# Patient Record
Sex: FEMALE | Race: WHITE | Employment: FULL TIME | URBAN - METROPOLITAN AREA
[De-identification: names, ages, dates, MRNs, and addresses within clinical notes are randomized per-mention and may not be internally consistent; named-entity substitution may affect disease eponyms.]

---

## 2022-05-24 ENCOUNTER — TELEPHONE (OUTPATIENT)
Dept: ORTHOPEDIC SURGERY | Age: 62
End: 2022-05-24

## 2022-05-24 NOTE — TELEPHONE ENCOUNTER
She is to have a MRI of her spine b4 6/1 please cassandra MRI the order is in old epic MRI will covert Order, thank you.

## 2022-06-01 ENCOUNTER — OFFICE VISIT (OUTPATIENT)
Dept: ORTHOPEDIC SURGERY | Age: 62
End: 2022-06-01
Payer: COMMERCIAL

## 2022-06-01 DIAGNOSIS — M17.11 OSTEOARTHRITIS OF RIGHT KNEE, UNSPECIFIED OSTEOARTHRITIS TYPE: ICD-10-CM

## 2022-06-01 DIAGNOSIS — G89.29 CHRONIC PAIN OF RIGHT KNEE: Primary | ICD-10-CM

## 2022-06-01 DIAGNOSIS — M25.561 CHRONIC PAIN OF RIGHT KNEE: Primary | ICD-10-CM

## 2022-06-01 PROCEDURE — 99204 OFFICE O/P NEW MOD 45 MIN: CPT | Performed by: ORTHOPAEDIC SURGERY

## 2022-06-01 PROCEDURE — 20611 DRAIN/INJ JOINT/BURSA W/US: CPT | Performed by: ORTHOPAEDIC SURGERY

## 2022-06-01 RX ORDER — TRIAMCINOLONE ACETONIDE 40 MG/ML
40 INJECTION, SUSPENSION INTRA-ARTICULAR; INTRAMUSCULAR ONCE
Status: COMPLETED | OUTPATIENT
Start: 2022-06-01 | End: 2022-06-01

## 2022-06-01 RX ADMIN — TRIAMCINOLONE ACETONIDE 40 MG: 40 INJECTION, SUSPENSION INTRA-ARTICULAR; INTRAMUSCULAR at 14:38

## 2022-06-01 NOTE — PROGRESS NOTES
Name: Althea Ashley  YOB: 1960  Gender: female  MRN: [id number]    CC:   Chief Complaint   Patient presents with    Knee Pain     Right new issues          HPI:   The pain has been present for months and is becoming worse. She was seen 1 week ago with left knee discomfort. She may possibly have instability of the left knee but this would be subtle. I am more suspicious she could have referred pain from her lumbar spine involving left side. This is Workmen's Comp. related. She is awaiting MRI approval.  It hurts at night when sleeping now involving the right knee. .  The pain is located over the knee. It does hurt to walk and gets worse with increased distances. The pain does not radiate down the leg. Numbness and tingling are not noted. Treatment so far has been medication injection therapies. No current outpatient medications on file. Not on File  History reviewed. No pertinent past medical history. .pmh  History reviewed. No pertinent surgical history. History reviewed. No pertinent family history. Social History     Socioeconomic History    Marital status: Unknown     Spouse name: Not on file    Number of children: Not on file    Years of education: Not on file    Highest education level: Not on file   Occupational History    Not on file   Tobacco Use    Smoking status: Not on file    Smokeless tobacco: Not on file   Substance and Sexual Activity    Alcohol use: Not on file    Drug use: Not on file    Sexual activity: Not on file   Other Topics Concern    Not on file   Social History Narrative    Not on file     Social Determinants of Health     Financial Resource Strain:     Difficulty of Paying Living Expenses: Not on file   Food Insecurity:     Worried About Running Out of Food in the Last Year: Not on file    Darren of Food in the Last Year: Not on file   Transportation Needs:     Lack of Transportation (Medical):  Not on file    Lack of Transportation (Non-Medical): Not on file   Physical Activity:     Days of Exercise per Week: Not on file    Minutes of Exercise per Session: Not on file   Stress:     Feeling of Stress : Not on file   Social Connections:     Frequency of Communication with Friends and Family: Not on file    Frequency of Social Gatherings with Friends and Family: Not on file    Attends Restoration Services: Not on file    Active Member of [address] [place] [address] or Organizations: Not on file    Attends Club or Organization Meetings: Not on file    Marital Status: Not on file   Intimate Partner Violence:     Fear of Current or Ex-Partner: Not on file    Emotionally Abused: Not on file    Physically Abused: Not on file    Sexually Abused: Not on file   Housing Stability:     Unable to Pay for Housing in the Last Year: Not on file    Number of [place] in the Last Year: Not on file    Unstable Housing in the Last Year: Not on file       Review of Systems:  As per HPI. Pertinent positives and negatives are addressed with the patient, particularly those related to musculoskeletal concerns. Non-orthopaedic concerns were referred back to the primary care physician. PHYSICAL EXAMINATION:   The patient appears their stated age and they are in no distress. The lower extremities are as described below. Circulation is normal with palpable pedal pulses bilaterally and no edema. There is no lymph adenopathy in the popliteal or malleolar region. The skin is without stasis disease distally bilaterally. Hip ROM is smooth and painless. Knee range of motion is 0-120 degrees on the right and 0-120 degrees on the left.  right knee: There is 2mm of anterior/posterior translation and 2mm of medial/lateral instability bilaterally. There is 4 degrees of varus alignment in the right knee. There is some pain to palpation over the medial joint line. Limb lengths are equal.  The gait is noted to be with a slight trendelenburg and antalgia.   Straight leg test is negative. Quadriceps strength is good. Sensation is intact to light touch bilaterally. Their judgment and insight are normal.  They are oriented to time, place and person. Their memory is good and the mood and affect appropriate. X-RAY: Views of the right knee are reviewed. 4 views reveal some joint space loss, eburnated bone, and osteophyte formation present. X-ray impression:  Moderate right osteoarthritis of the knee    IMPRESSION:    Diagnosis Orders   1. Chronic pain of right knee  XR KNEE RIGHT (MIN 4 VIEWS)   2. Osteoarthritis of right knee, unspecified osteoarthritis type     . RECOMMENDATIONS:    Reviewed x-ray findings with the patient. Today we discussed conservative treatments such as NSAIDs and PT. To date these have not been effective for the patient. The concerns with functional limitations are increasing and response is variable with conservative measures. Surgery was discussed today with the patient. They are not limited to warrant any type of surgical consideration. We will additionally try injection therapies as we process management of this progressive and chronic condition. Procedure Note: The right knee was prepped with alcohol and injected with 40 mg of Kenalog and 2 mL of 0.5 % marcaine. [employer] US unit with a variable frequency (6.0-15.0 MHz) linear transducer was used to visualize the retropatellar fat pad, patella, patellar tendon, tibia, and to ensure proper intra-articular placement of medication. Injection image was stored in the patient's permanent chart. The injection was without event and I will follow them as scheduled. Approximately 45 minutes was spent reviewing the medical record, imaging, performing history and physical examination, discussing the diagnosis and treatment plan with the patient, and completing documentation for this visit. He will return when the MRI is complete of her spine to see if this has any bearing on her left leg pain. I would like to make sure that her spine is not an issue and may be consider a selective injection of her left knee before pursuing any type of surgical involvement, her tibia has anterior slope to it.

## 2022-06-13 ENCOUNTER — OFFICE VISIT (OUTPATIENT)
Dept: ORTHOPEDIC SURGERY | Age: 62
End: 2022-06-13
Payer: COMMERCIAL

## 2022-06-13 DIAGNOSIS — M48.061 LUMBAR STENOSIS WITHOUT NEUROGENIC CLAUDICATION: ICD-10-CM

## 2022-06-13 DIAGNOSIS — Z96.652 HISTORY OF TOTAL LEFT KNEE REPLACEMENT: Primary | ICD-10-CM

## 2022-06-13 PROCEDURE — 99214 OFFICE O/P EST MOD 30 MIN: CPT | Performed by: ORTHOPAEDIC SURGERY

## 2022-06-13 NOTE — PROGRESS NOTES
Nereida Juárez is back today for discussion of the MRI of her lumbar spine. And discussed the fact that she feels like her left knee may be a bit loose since her fall. She was seen for a second opinion for this. Discomfort is tender related also to a radicular issue as we discussed things. On exam she got a negative straight leg raise. She has about 4 degrees anterior posterior translation she has no medial lateral laxity in full extension. She got a little bit of clicking flexion that is typical.  Visions well-healed there is no effusion. Reviewed the x-rays reveal good bone prosthetic cement interface with a cruciate retaining left knee arthroplasty. The MR study reveals lower lumbar spine facet arthropathy and cyst formation. Think the dysfunction she feels in her left lower extremity could be related more to her spine than her knee. I initially considered possibly she could consider revision to a TS component. She just does not demonstrate any effusion and any anterior posterior translation that makes me think this would be of substantial benefit. This more likely her limitations particularly the numbness she feels when she walks after 10 minutes is related to her spine. She could have a neurogenic claudication. I would recommend she have her spine evaluated first to see if that need to be addressed. She then could go ahead and consider revision to a constrained component with her initial surgeon. 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 Again a lot of her symptoms I suspect are related to her spine.

## 2022-06-13 NOTE — LETTER
[address] [address]  Phone: [phone]  Fax: [phone]    Brennan Narvaez MD        June 13, 2022     Patient: Prasad Her   YOB: 1960   Date of Visit: 6/13/2022       To Whom It May Concern: It is my medical opinion that Prasad Her may return to work as scheduled and follow up with the spine team to further evaluate back issues. If you have any questions or concerns, please don't hesitate to call.     Sincerely,        Brennan Narvaez MD

## 2022-07-01 ENCOUNTER — OFFICE VISIT (OUTPATIENT)
Dept: ORTHOPEDIC SURGERY | Age: 62
End: 2022-07-01
Payer: COMMERCIAL

## 2022-07-01 DIAGNOSIS — S99.912A LEFT ANKLE INJURY, INITIAL ENCOUNTER: ICD-10-CM

## 2022-07-01 DIAGNOSIS — S82.832A CLOSED AVULSION FRACTURE OF DISTAL END OF LEFT FIBULA, INITIAL ENCOUNTER: Primary | ICD-10-CM

## 2022-07-01 DIAGNOSIS — M54.16 LUMBAR RADICULOPATHY: ICD-10-CM

## 2022-07-01 DIAGNOSIS — M71.38 SYNOVIAL CYST OF LUMBAR FACET JOINT: ICD-10-CM

## 2022-07-01 DIAGNOSIS — M43.16 SPONDYLOLISTHESIS OF LUMBAR REGION: ICD-10-CM

## 2022-07-01 DIAGNOSIS — M48.062 LUMBAR STENOSIS WITH NEUROGENIC CLAUDICATION: ICD-10-CM

## 2022-07-01 DIAGNOSIS — M54.50 LOW BACK PAIN, UNSPECIFIED BACK PAIN LATERALITY, UNSPECIFIED CHRONICITY, UNSPECIFIED WHETHER SCIATICA PRESENT: ICD-10-CM

## 2022-07-01 PROCEDURE — L1902 AFO ANKLE GAUNTLET PRE OTS: HCPCS | Performed by: PHYSICIAN ASSISTANT

## 2022-07-01 PROCEDURE — 99204 OFFICE O/P NEW MOD 45 MIN: CPT | Performed by: PHYSICIAN ASSISTANT

## 2022-07-01 RX ORDER — CARVEDILOL 12.5 MG/1
TABLET ORAL
COMMUNITY
Start: 2022-06-28

## 2022-07-01 RX ORDER — PRAVASTATIN SODIUM 20 MG
20 TABLET ORAL NIGHTLY
COMMUNITY
Start: 2022-03-25

## 2022-07-01 RX ORDER — FEXOFENADINE HCL 30 MG/5 ML
10 SUSPENSION, ORAL (FINAL DOSE FORM) ORAL
COMMUNITY

## 2022-07-01 RX ORDER — AMLODIPINE BESYLATE 5 MG/1
TABLET ORAL
COMMUNITY
Start: 2022-06-28

## 2022-07-01 RX ORDER — HYDROCODONE BITARTRATE AND ACETAMINOPHEN 5; 325 MG/1; MG/1
TABLET ORAL
COMMUNITY
Start: 2022-06-23

## 2022-07-01 RX ORDER — SERTRALINE HYDROCHLORIDE 100 MG/1
TABLET, FILM COATED ORAL
COMMUNITY
Start: 2022-06-28

## 2022-07-01 RX ORDER — TRIAMTERENE AND HYDROCHLOROTHIAZIDE 37.5; 25 MG/1; MG/1
1 TABLET ORAL DAILY
COMMUNITY
Start: 2022-03-25

## 2022-07-01 RX ORDER — NALOXONE HYDROCHLORIDE 4 MG/.1ML
1 SPRAY NASAL PRN
COMMUNITY
Start: 2021-08-10

## 2022-07-01 RX ORDER — GABAPENTIN 100 MG/1
CAPSULE ORAL
COMMUNITY
Start: 2022-04-04

## 2022-07-01 RX ORDER — ASPIRIN 325 MG
325 TABLET ORAL 2 TIMES DAILY
COMMUNITY
Start: 2021-08-03

## 2022-07-01 NOTE — LETTER
Michae Chet                                                     KERRY HARRELL  1960  MRN [id number]                                              ROOM NUMBER______      Radiographic Studies:    Cervical MRI      Thoracic MRI         Lumbar MRI          Pelvis MRI        CONTRAST    CT Myelogram: _______________   NCS/EMG ________________ ( UE  /  LE )     MRI of ___________________          Other: ____________________      Injections:    KNEE    HIP  Depomedrol _____ mg Euflexxa _____    _______________ TFESI/SNRB  _______________ SI Joint  _______________ KEN    _______________ Facet  _______________Piriformis/ Sciatica      Medications:    Oral Steroids _______________  NSAIDS _______________    Muscle Relaxers _______________  Neurontin/Lyrica _______________    Pain Medicine _______________  Other _______________                       Physical Therapy:    Lumbar     Thoracic      Cervical     Hip       Knee       Shoulder               Traction          Ultrasound          Dry Needling      Referral:    Pain referral:  [facility]   [facility]   Other: ______________________________    Follow-up/ Refer__________________________________________________    Authorization to hold blood thinners:___________________________________

## 2022-07-01 NOTE — LETTER
Lumbar Spine Injection Precert Authorization Form    Name: Reba Ivey  : 1960  Age: 58 y.o. Gender: female  Home Phone #: [phone]    Clinical Information    Referring Doctor:  KERRY CLEMENTS Diagnosis:   Body Part: lumbar spine    Type of Service    [x] 90387+- KEN Caudal or Lumbar    [] 84866- Facet Lumbar (single Level)    [] 23318- Facet 2nd Level    [] 95065- Facet 3 or more level    [] 97034- Sacroiliac joint     [] 73392- SNRB Transforaminal KEN Lumbar or Sacral (single level)    [] 16099- SNRB add levels Lumbar or Sacral     [] 82989- Sciatic Nerve, single.      [] 98709- Radiofrequency L/S single facet     [] 15745- Radiofrequency L/s additional facet       Comments   L4-5 KEN  Anticoagulants      Insurance:    [facility]     Radiologic evidence to support diagnosis:   Non-drug therapy:   Pharmacologic Therapy:

## 2022-07-01 NOTE — PROGRESS NOTES
Name: Love Roberts  YOB: 1960  Gender: female  MRN: [id number]    CC: Leg Pain (Lt leg numbness)       HPI: This is a 58y.o. year old female who had a left total knee arthroplasty August 2021 by Dr. Jo Ann Gómez in [facility]. She had a fall in December at work on her left knee. Since the fall she has felt the left knee giving way and pain in the left knee. She is also experienced numbness of the left leg and the entire leg feels like it will give away. She had extensive physical therapy for her left knee and then requested a second opinion. Dr. Erin Richardson in May for a second opinion. There was concern for some instability of the left knee however because of the entire left leg weakness and paresthesias that she is experiencing, there was concern for referred pain from the lumbar spine and a spine issue causing her radicular symptoms. She had an MRI scan of the lumbar spine and has been referred to us for further recommendations. Patient states her leg is giving away with her and she feels she is falling. She 6/23 fell and has been diagnosed with an avulsion fracture of her left ankle. She is not wearing her splint today what told me that she was placed in a splint. The leg pain is more in the left lateral leg and there is numbness in the lateral aspect of the knee and lateral calf. This is worse with standing and walking. History was obtained by patient    The patient denies any change in bowel or bladder function since the onset of the symptoms. she  has not had lumbar surgery in the past.       AMB PAIN ASSESSMENT 7/1/2022   Location of Pain Leg   Location Modifiers Left   Quality of Pain Dull   Duration of Pain Persistent   Frequency of Pain Constant   Date Pain First Started 2/8/2022   Aggravating Factors Walking;Standing   Limiting Behavior Yes   Result of Injury No   Work-Related Injury No   Are there other pain locations you wish to document?  No ROS/Meds/PSH/PMH/FH/SH: I personally reviewed the patient's collected intake data. Below are the pertinents:    Allergies   Allergen Reactions    Sulfamethoxazole-Trimethoprim Rash         Current Outpatient Medications:     aspirin 325 MG tablet, Take 325 mg by mouth 2 times daily, Disp: , Rfl:     diclofenac sodium (VOLTAREN) 1 % GEL, Apply 4 g topically 4 times daily, Disp: , Rfl:     pravastatin (PRAVACHOL) 20 MG tablet, Take 20 mg by mouth nightly, Disp: , Rfl:     naloxone 4 MG/0.1ML LIQD nasal spray, 1 spray by Nasal route as needed, Disp: , Rfl:     triamterene-hydroCHLOROthiazide (MAXZIDE-25) 37.5-25 MG per tablet, Take 1 tablet by mouth daily, Disp: , Rfl:     amLODIPine (NORVASC) 5 MG tablet, , Disp: , Rfl:     carvedilol (COREG) 12.5 MG tablet, , Disp: , Rfl:     Cetirizine HCl (WAL-ZYR) 10 MG CAPS, Take 10 mg by mouth, Disp: , Rfl:     gabapentin (NEURONTIN) 100 MG capsule, , Disp: , Rfl:     HYDROcodone-acetaminophen (NORCO) 5-325 MG per tablet, , Disp: , Rfl:     sertraline (ZOLOFT) 100 MG tablet, , Disp: , Rfl:     History reviewed. No pertinent surgical history. There is no problem list on file for this patient. Tobacco:  reports that she has quit smoking. Her smoking use included cigarettes. She has never used smokeless tobacco.  Alcohol:   Social History     Substance and Sexual Activity   Alcohol Use None        Physical Exam:   BMI: There is no height or weight on file to calculate BMI. GENERAL:  Adult in no acute distress, mildly obese Patient is appropriately conversant  MSK:  Examination of the lumbar spine reveals no sagittal or coronal imbalance   There is no tenderness to palpation along the spinous processes and paraspinal musculature. The patient ambulates with a antalgic gait. ROM of bilateral hip(s) reveals no irritability. NEURO:  Cranial nerves grossly intact. No motor deficits.     Straight leg testing is positive left  Sensory testing reveals No neural foraminal stenosis. L1-L2: Minimal disc bulge without significant spinal canal stenosis. No neural  foraminal stenosis. L2-L3: Minimal disc bulge without significant spinal canal stenosis. No neural  foraminal stenosis. L3-L4: Minimal bilateral paracentral disc bulges with minimal spinal canal  stenosis. Mild right neural foraminal stenosis. L4-L5: Uncovering of the disc without a substantial disc bulge. Moderate  bilateral facet arthropathy with ligamentum flavum thickening and osseous  hypertrophy and a left-sided synovial facet joint cyst measuring approximately 8  x 3 mm with slight mass effect upon the thecal sac. Moderate spinal canal  stenosis. Mild bilateral neural foraminal stenosis. L5-S1: No disc bulge or spinal canal stenosis. No neural foraminal stenosis. Impression  Grade 1 anterolisthesis at L4-L5 with moderate bilateral facet arthropathy and  with a left-sided synovial facet joint cyst measuring 8 x 3 mm with moderate  spinal canal stenosis at this level. Additional degenerative changes as above. I have independently reviewed the x-rays of the lumbar spine. She has mild scoliosis. There is bulky facet arthropathy L4-5 there is an anterior listhesis L4-5. I have also independently reviewed the MRI scan of the lumbar spine. She has a grade 1 anterior listhesis L4-5 with facet arthropathy and there is a left-sided synovial facet joint cyst that compresses in thecal sac. 7/1/22  Three (3) views of the left ankle were obtained. FINDINGS: Small osseous avulsion adjacent to the distal fibula. Joint spaces are grossly unremarkable. Mortise is stable. No suspicious osseous lesions. IMPRESSION:   1. AVULSION FRACTURE OF THE DISTAL FIBULA       Three (3) views of the left foot were obtained. FINDINGS: No acute fracture or subluxation. Joint spaces are maintained. No   suspicious osseous lesions. IMPRESSION:     NO ACUTE FRACTURE OR SUBLUXATION. today.  I am recommending an ankle lace up brace follow-up with Dr. Tea Polanco. No orders of the defined types were placed in this encounter. Orders Placed This Encounter   Procedures    XR LUMBAR SPINE (2-3 VIEWS)    XR FOOT LEFT (MIN 3 VIEWS)    XR ANKLE LEFT (MIN 3 VIEWS)    Ambulatory referral to Orthopedic Surgery    Wraptor Ankle Brace ()            Return for lumbar injection, lumbar injection recheck wt TOBI, 4 weeks after. Merly Shi PA-C  07/01/22      Elements of this note were created using speech recognition software. As such, errors of speech recognition may be present.

## 2022-07-01 NOTE — LETTER
[facility] [address] [address]  Phone: [phone]  Fax: [phone]    Stevan Tineo PA-C      Patient: Noah Davis   MR Number: [id number]   YOB: 1960   Date of Visit: 2022        DME Patient Authorization Form    Name: Noah Davis  : 1960  MRN: [id number]   Age: 58 y.o. Gender: female  Delivery Address: [facility]     Diagnosis:     ICD-10-CM    1. Synovial cyst of lumbar facet joint  M71.38    2. Low back pain, unspecified back pain laterality, unspecified chronicity, unspecified whether sciatica present  M54.50 XR LUMBAR SPINE (2-3 VIEWS)   3. Lumbar stenosis with neurogenic claudication  M48.062    4. Spondylolisthesis of lumbar region  M43.16    5. Lumbar radiculopathy  M54.16    6. Closed avulsion fracture of distal end of left fibula, initial encounter  S82.832A    7. Left ankle injury, initial encounter  S99.912A XR FOOT LEFT (MIN 3 VIEWS)     XR ANKLE LEFT (MIN 3 VIEWS)        Requested DME:  Wraptor Ankle Brace - -69 ($129.00) X 1 - left        Clinical Notes:     **Indicates non-covered items by insurance. Payment expected on date of service. Electronically signed by  Provider: _______________________________ Date: 2022                             [facility]/[address] Tax ID # [id number]        Durable Medical Equipment and/or Orthotics Patient Consent     I understand that my physician has prescribed this medical supply as part of my treatment plan as a matter of Medical Necessity.  I understand that I have a choice in where I receive my prescribed orthopedic supplies and/or services.  I authorize [facility] to furnish this service/product and to provide my insurance carrier with any information requested in order to process for payment.    I instruct my insurance carrier to pay [facility] [place] directly for these services/products.  I understand that my insurance carrier may deny payment for this supply because it is a non-covered item, deemed not medically necessary or considered experimental.   I understand that any cost not covered by my insurance carrier will be solely my financial responsibility.  I have received the Supplier Standards and have reviewed them.  I have received the prescribed item and have been fully instructed on the proper use of the above services/products.    ______ (Patient Initials) I understand that all DME items are non-returnable after being dispensed. Items still in sealed packaging may be returned up to 14 days after purchasing. [address] will replace items that are defective.    ______ (Patient Initials) I understand that [facility] will not file a claim with my insurance carrier for this service/product and I am waiving my right to file a claim on my own for this service/product with my insurance company as this item is NON-COVERED (Denoted by the **) by my Insurance company/policy. ______ (Patient Initials) I understand that I am responsible to bring my equipment to the hospital for any surgery. ______________________________________________  ________________________  Patient / Com[facility] Galdame[facility]            Thank you for considering [address] Your physician has prescribed specific medical equipment or devices for your home use. The following describes your rights and responsibilities as our customer. Right to Choose Providers: You have a choice regarding which company supplies your home medical equipment and devices, and to consult your physician in this decision.   You may choose a medical supply store, a home medical equipment provider, or a specialist such as [facility]. [facility] will coordinate with your physician to provide the medical equipment or devices prescribed for your home use. Right to Service:  You have the right to considerate, respectful and nondiscriminatory care. You have the right to receive accurate and easily understood information about your health care. If you speak a foreign language, or don't understand the discussions, assistance will be provided to allow you to make informed health care decisions. You have the right to know your treatment options and to participate in decisions about your care, including the right to accept or refuse treatment. You have the right to expect a reasonable response to your requests for treatment or service. You have the right to talk in confidence with health care providers and to have your health care information protected. You have the right to receive an explanation of your bill. You have the right to complain about the service or product you receive. Patient Responsibilities:  Please provide complete and accurate information about your health insurance benefits and make arrangements for the timely payment of your bill. [facility] will, if possible, assume responsibility for billing your insurance (Medicare, Medicaid and commercial) for the prescribed equipment or devices. If your policy does not cover the prescribed product, or only covers a portion of the bill, you are responsible for any remaining balance. Return and Exchange Policy:  [facility] will honor published  Warranties for products. [facility] will accept returns or exchanges within 14 days from the date of receipt, providin) the product must be in new condition; 2) receipt as required; and 3) used disposable and hygiene products may only be returned due to a defective product. Note: Refunds will be issued in a timely manner, please allow 4-6 weeks for processing.    Complaint Procedures and DME Consumer Protection Resources:  [facility] values you as a customer, and is committed to resolving patient concerns. This commitment includes understanding and documenting your concerns, conducting a review of internal procedures, and providing you with an explanation and resolution to your concerns. Should you have any questions about our services or billing process, please contact our office at (practice phone number). If we are unable to resolve the concern, you have the right to direct comments to the office of Consumer Protection, in the [address] [place] [address]. S.W or the [facility] office, without fear of repercussion. DMEPOS SUPPLIER STANDARDS    A supplier must be in compliance with all applicable Federal and [employer] and regulatory requirements. A supplier must provide complete and accurate information on the DMEPOS supplier application. Any changes to this information must be reported to the [employer] within 30 days. An authorized individual (one whose signature is binding) must sign the application for billing privileges. A supplier must fill orders from its own inventory, or must contract with other companies for the purchase of items necessary to fill the order. A supplier may not contract with any entity that is currently excluded from the Medicare program, any [facility] program, or from any other Federal procurement or Nonprocurement programs. A supplier must advise beneficiaries that they may rent or purchase inexpensive or routinely purchased durable medical equipment, and of the purchase option for capped rental equipment. A supplier must notify beneficiaries of warranty coverage and honor all warranties under applicable State Law, and repair or replace free of charge Medicare covered items that are under warranty. A supplier must maintain a physical facility on an appropriate site.   A supplier must permit CMS, or its agents to conduct on-site inspections to ascertain the supplier's compliance with these standards. The supplier location must be accessible to beneficiaries during reasonable business hours, and must maintain a visible sign and posted hours of operation. A supplier must maintain a primary business telephone listed under the name of the business in a [employer] or a toll free number available through directory assistance. The exclusive use of a beeper, answering machine or cell phone is prohibited. A supplier must have comprehensive liability insurance in the amount of at least $300,000 that covers both the supplier's place of business and all customers and employees of the supplier. If the supplier manufactures its own items, this insurance must also cover product liability and completed operations. A supplier must agree not to initiate telephone contact with beneficiaries, with a few exceptions allowed. This standard prohibits suppliers from calling beneficiaries in order to solicit new business. A supplier is responsible for delivery and must instruct beneficiaries on use of Medicare covered items, and maintain proof of delivery. A supplier must answer questions, and respond to complaints of the beneficiaries, and maintain documentation of such contacts. A supplier must maintain and replace at no charge or repair directly, or through a service contract with another company, Medicare covered items it has rented to beneficiaries. A supplier must accept returns of substandard (less than full quality for the particular item) or unsuitable items (inappropriate for the beneficiary at the time it was fitted and rented or sold) from beneficiaries. A supplier must disclose these supplier standards to each beneficiary to whom it supplies a Medicare-covered item. A supplier must disclose to the government any person having ownership, financial, or control interest in the supplier.   A supplier must not convey or reassign a supplier number; i.e., the supplier may not sell or allow another entity to use its Medicare billing number. A supplier must have a complaint resolution protocol established to address beneficiary complaints that relate to these standards. A record of these complaints must be maintained at the physical facility. Complaint records must include: the name, address, telephone number and health insurance claim number of the beneficiary, a summary of the complaint, and any action taken to resolve it. A supplier must agree to furnish CMS any information required by the Medicare statute and implementing regulations. A supplier of DMEPOS and other items and services must be accredited by a CMS-approved accreditation organization in order to receive and retain a supplier billing number. The accreditation must indicate the specific products and services, for which the supplier is accredited in order for the supplier to receive payment for those specific products and services. A DMEPOS supplier must notify their accreditation organization when a new DMEPOS location is opened. The accreditation organization may accredit the new supplier location for three months after it is operational without requiring a new site visit. All DMEPOS supplier locations, whether owned or subcontracted, must meet the [employer] and be separately accredited in order to bill Medicare. An accredited supplier may be denied enrollment or their enrollment may be revoked, if CMS determines that they are not in compliance with the DMEPOS quality standards. A DMEPOS supplier must disclose upon enrollment all products and services, including the addition of new product lines for which they are seeking accreditation. If a new product line is added after enrollment, the DMEPOS supplier will be responsible for notifying the accrediting body of the new product so that the DMEPOS supplier can be re-surveyed and accredited for these new products.   Must meet the surety bond requirements specified in 42 C. F.R. 424.57(c). Implementation date- May 4, 2009. A supplier must obtain oxygen from a state-licensed oxygen supplier. A supplier must maintain ordering and referring documentation consistent with provisions found in 42 C. F.R. 424.516(f). DMEPOS suppliers are prohibited from sharing a practice location with certain other Medicare providers and suppliers. DMEPOS suppliers must remain open to the public for a minimum of 30 hours per week with certain exceptions.

## 2022-07-01 NOTE — PROGRESS NOTES
The patient was prescribed a Wraptor brace for the patient's leftfoot. The patient wears a size 8.5 shoe and I fitted the patient with a L brace. I explained how to fit the brace properly by pulling the lace tabs across top of foot first then under arch and lastly pulling the strap up firmly and attaching to the lateral Velcro strip. Thus forming a figure 8 across the ankle joint. Once the figure 8 is completed they are to secure the top (short circumferential) straps to help avoid the straps from loosening with normal wear. The patient was able to demonstrate proper fitting in office to ensure compliance with device and acknowledged satisfaction with current fit. Patient read and signed documenting they understand and agree to [facility] current DME return policy.

## 2022-07-05 ENCOUNTER — CLINICAL DOCUMENTATION (OUTPATIENT)
Dept: ORTHOPEDIC SURGERY | Age: 62
End: 2022-07-05

## 2022-07-05 NOTE — PROGRESS NOTES
PER WILMER WHITLOCK THE [facility] FOR PATIENT,HER APPT WITH  WILL BE UNDER HER PERSONAL INS. NOT A COMPENSIBLE BODY PART WITH WC.

## 2022-07-12 ENCOUNTER — OFFICE VISIT (OUTPATIENT)
Dept: ORTHOPEDIC SURGERY | Age: 62
End: 2022-07-12
Payer: COMMERCIAL

## 2022-07-12 VITALS — BODY MASS INDEX: 31.07 KG/M2 | HEIGHT: 68 IN | WEIGHT: 205 LBS

## 2022-07-12 DIAGNOSIS — S82.892A CLOSED AVULSION FRACTURE OF LEFT ANKLE, INITIAL ENCOUNTER: Primary | ICD-10-CM

## 2022-07-12 DIAGNOSIS — M79.671 PAIN OF RIGHT HEEL: ICD-10-CM

## 2022-07-12 DIAGNOSIS — S99.912A INJURY OF LEFT ANKLE, INITIAL ENCOUNTER: ICD-10-CM

## 2022-07-12 DIAGNOSIS — M72.2 PLANTAR FASCIITIS: ICD-10-CM

## 2022-07-12 PROCEDURE — 99214 OFFICE O/P EST MOD 30 MIN: CPT | Performed by: ORTHOPAEDIC SURGERY

## 2022-07-12 PROCEDURE — L2397 SUSPENSION SLEEVE LOWER EXT: HCPCS | Performed by: ORTHOPAEDIC SURGERY

## 2022-07-12 PROCEDURE — L1902 AFO ANKLE GAUNTLET PRE OTS: HCPCS | Performed by: ORTHOPAEDIC SURGERY

## 2022-07-12 PROCEDURE — L4370 PNEUM FULL LEG SPLNT PRE OTS: HCPCS | Performed by: ORTHOPAEDIC SURGERY

## 2022-07-12 NOTE — LETTER
DME Patient Authorization Form    Name: Grey Quintero  : 1960  MRN: [id number]   Age: 58 y.o. Gender: female  Delivery Address: [facility]     Diagnosis:     ICD-10-CM    1. Closed avulsion fracture of left ankle, initial encounter  S82.645R    2. Injury of left ankle, initial encounter  S99.356P    3. Pain of right heel  M79.671    4. Plantar fasciitis  M72.2         Requested DME:  Aircast Air Heel -  ($35.00) X 1 - right  Legbrace -  ($200.00) X 1 - left  Reparel Ankle Sleeve**AANKL ($30) X 2 - bilateral        Clinical Notes:     **Indicates non-covered items by insurance. Payment expected on date of service. Electronically signed by  Provider: Du Jane. Allison Squires MD Date: 2022                             [facility] Tax ID # [id number]        Durable Medical Equipment and/or Orthotics Patient Consent     I understand that my physician has prescribed this medical supply as part of my treatment plan as a matter of Medical Necessity.  I understand that I have a choice in where I receive my prescribed orthopedic supplies and/or services.  I authorize [facility] to furnish this service/product and to provide my insurance carrier with any information requested in order to process for payment.  I instruct my insurance carrier to pay [facility] directly for these services/products.  I understand that my insurance carrier may deny payment for this supply because it is a non-covered item, deemed not medically necessary or considered experimental.   I understand that any cost not covered by my insurance carrier will be solely my financial responsibility.  I have received the Supplier Standards and have reviewed them.    I have received the prescribed item and have been fully instructed on the proper use of the above services/products.    ______ (Patient Initials) I understand that all DME items are non-returnable after being dispensed. Items still in sealed packaging may be returned up to 14 days after purchasing. [address] will replace items that are defective.    ______ (Patient Initials) I understand that Josie Blanc will not file a claim with my insurance carrier for this service/product and I am waiving my right to file a claim on my own for this service/product with my insurance company as this item is NON-COVERED (Denoted by the **) by my Insurance company/policy. ______ (Patient Initials) I understand that I am responsible to bring my equipment to the hospital for any surgery. ______________________________________________  ________________________  Patient / Comfort Galdamez            Thank you for considering [address] Your physician has prescribed specific medical equipment or devices for your home use. The following describes your rights and responsibilities as our customer. Right to Choose Providers: You have a choice regarding which company supplies your home medical equipment and devices, and to consult your physician in this decision. You may choose a medical supply store, a home medical equipment provider, or a specialist such as [facility]. [facility] will coordinate with your physician to provide the medical equipment or devices prescribed for your home use. Right to Service:  You have the right to considerate, respectful and nondiscriminatory care. You have the right to receive accurate and easily understood information about your health care. If you speak a foreign language, or don't understand the discussions, assistance will be provided to allow you to make informed health care decisions.   You have the right to know your treatment options and to participate in decisions about your care, including the right to accept or refuse treatment. You have the right to expect a reasonable response to your requests for treatment or service. You have the right to talk in confidence with health care providers and to have your health care information protected. You have the right to receive an explanation of your bill. You have the right to complain about the service or product you receive. Patient Responsibilities:  Please provide complete and accurate information about your health insurance benefits and make arrangements for the timely payment of your bill. [facility] will, if possible, assume responsibility for billing your insurance (Medicare, Medicaid and commercial) for the prescribed equipment or devices. If your policy does not cover the prescribed product, or only covers a portion of the bill, you are responsible for any remaining balance. Return and Exchange Policy:  [facility] will honor published  Warranties for products. [facility] will accept returns or exchanges within 14 days from the date of receipt, providin) the product must be in new condition; 2) receipt as required; and 3) used disposable and hygiene products may only be returned due to a defective product. Note: Refunds will be issued in a timely manner, please allow 4-6 weeks for processing. Complaint Procedures and [facility] Consumer Protection Resources:  [facility] values you as a customer, and is committed to resolving patient concerns. This commitment includes understanding and documenting your concerns, conducting a review of internal procedures, and providing you with an explanation and resolution to your concerns. Should you have any questions about our services or billing process, please contact our office at (practice phone number).   If we are unable to resolve the concern, you have the right to direct comments to the office of Consumer Protection, in the [address] or the [address] office, without fear of repercussion. DMEPOS SUPPLIER STANDARDS    A supplier must be in compliance with all applicable Federal and [facility] and regulatory requirements. A supplier must provide complete and accurate information on the DMEPOS supplier application. Any changes to this information must be reported to the [facility] within 30 days. An authorized individual (one whose signature is binding) must sign the application for billing privileges. A supplier must fill orders from its own inventory, or must contract with other companies for the purchase of items necessary to fill the order. A supplier may not contract with any entity that is currently excluded from the Medicare program, any [facility] program, or from any other Federal procurement or Nonprocurement programs. A supplier must advise beneficiaries that they may rent or purchase inexpensive or routinely purchased durable medical equipment, and of the purchase option for capped rental equipment. A supplier must notify beneficiaries of warranty coverage and honor all warranties under applicable State Law, and repair or replace free of charge Medicare covered items that are under warranty. A supplier must maintain a physical facility on an appropriate site. A supplier must permit CMS, or its agents to conduct on-site inspections to ascertain the supplier's compliance with these standards. The supplier location must be accessible to beneficiaries during reasonable business hours, and must maintain a visible sign and posted hours of operation. A supplier must maintain a primary business telephone listed under the name of the business in a [employer] or a toll free number available through directory assistance. The exclusive use of a beeper, answering machine or cell phone is prohibited.   A supplier must have comprehensive liability insurance in the amount of at least $300,000 that covers both the supplier's place of business and all customers and employees of the supplier. If the supplier manufactures its own items, this insurance must also cover product liability and completed operations. A supplier must agree not to initiate telephone contact with beneficiaries, with a few exceptions allowed. This standard prohibits suppliers from calling beneficiaries in order to solicit new business. A supplier is responsible for delivery and must instruct beneficiaries on use of Medicare covered items, and maintain proof of delivery. A supplier must answer questions, and respond to complaints of the beneficiaries, and maintain documentation of such contacts. A supplier must maintain and replace at no charge or repair directly, or through a service contract with another company, Medicare covered items it has rented to beneficiaries. A supplier must accept returns of substandard (less than full quality for the particular item) or unsuitable items (inappropriate for the beneficiary at the time it was fitted and rented or sold) from beneficiaries. A supplier must disclose these supplier standards to each beneficiary to whom it supplies a Medicare-covered item. A supplier must disclose to the government any person having ownership, financial, or control interest in the supplier. A supplier must not convey or reassign a supplier number; i.e., the supplier may not sell or allow another entity to use its Medicare billing number. A supplier must have a complaint resolution protocol established to address beneficiary complaints that relate to these standards. A record of these complaints must be maintained at the physical facility. Complaint records must include: the name, address, telephone number and health insurance claim number of the beneficiary, a summary of the complaint, and any action taken to resolve it.   A supplier must agree to furnish CMS any information required by the Medicare statute and implementing regulations. A supplier of DMEPOS and other items and services must be accredited by a CMS-approved accreditation organization in order to receive and retain a supplier billing number. The accreditation must indicate the specific products and services, for which the supplier is accredited in order for the supplier to receive payment for those specific products and services. A DMEPOS supplier must notify their accreditation organization when a new DMEPOS location is opened. The accreditation organization may accredit the new supplier location for three months after it is operational without requiring a new site visit. All DMEPOS supplier locations, whether owned or subcontracted, must meet the [employer] and be separately accredited in order to bill Medicare. An accredited supplier may be denied enrollment or their enrollment may be revoked, if CMS determines that they are not in compliance with the DMEPOS quality standards. A DMEPOS supplier must disclose upon enrollment all products and services, including the addition of new product lines for which they are seeking accreditation. If a new product line is added after enrollment, the DMEPOS supplier will be responsible for notifying the accrediting body of the new product so that the DMEPOS supplier can be re-surveyed and accredited for these new products. Must meet the surety bond requirements specified in 42 C. F.R. 424.57(c). Implementation date- May 4, 2009. A supplier must obtain oxygen from a state-licensed oxygen supplier. A supplier must maintain ordering and referring documentation consistent with provisions found in 42 C. F.R. 424.516(f). DMEPOS suppliers are prohibited from sharing a practice location with certain other Medicare providers and suppliers. DMEPOS suppliers must remain open to the public for a minimum of 30 hours per week with certain exceptions.

## 2022-07-12 NOTE — PROGRESS NOTES
seem to help. Right heel pain has increased since her left ankle and leg incident  2. Left ankle pain. She tried a lace up ankle brace for Ms. Jayla Swift but it put too much pressure on her outer ankle so she has been removing and reapplying a splint from the ER    ROS/Meds/PSH/PMH/FH/SH: reviewed today    Tobacco:  reports that she has quit smoking. Her smoking use included cigarettes. She has never used smokeless tobacco.     Physical Examination:  Patient appears to be alert and oriented with acceptable appearance. No obvious distress or SOB  CV: appears to have acceptable vascular color and capillary refill  Neuro:appears to have mostly intact light touch sensation   Skin: Left lateral ankle soft tissue swelling  MS: Standing: Plantigrade: Gait protected left and first steps right  Right = low tarsal tunnel, Baxters nerve area pain; no posterior or lateral tuberosity pain; no Achilles pain; no fascial deficiency  Right = full ankle/foot motion; 5/5 strength; no instability or crepitance  Left = lateral > medial ankle pain; no midfoot pain; has ankle and foot motion but too tender deformity stress test    XR: Left side: Standing AP lateral mortise ankle plus AP oblique foot taken today with lateral malleolar sleeve type avulsion fracture; soft tissue edema present  XR Impression:  As above      XR: Right side: Standing AP lateral mortise ankle plus AP oblique foot taken on return  XR Impression:  As above     Reviewed Test/Records/Documents: As reflected above    Injection: Discussed right heel injection, but she has an upcoming spine injection in 3 days so hold foot and ankle injection today    Assessment:    Right plantar fasciitis, low tarsal tunnel syndrome  Left ankle injury; lateral malleolar sleeve avulsion fracture; Deltod sprain    Plan:   The patient and I discussed the above assessment. We explored treatment options.      She feels her right heel pain increased by protecting her left lower extremity  She reports her therapist discussed heel injection but will hold off injection today pending her spine injection    We then discussed her left lateral malleolar sleeve avulsion fracture. She has no evidence of peroneal tendon dislocation so hopefully she will develop ligament stability  Advanced medical imaging: If no resolution or improvement: Left ankle MRI scan to assess lateral malleolar sleeve avulsion fracture for a peroneal tendon tear    DME: No 3D boot due to her low back and left leg concerns  Right = Air heel: Reparel sleeve  Left = Leg brace: Reparel sleeve  We discussed right and left foot and ankle care and protection  PT: Instead of formal PT, at this time she was instructed on strict home exercise stretching program  Orthotic/prosthetic: Future consideration for custom insoles       Medication - OTC meds prn: Prescribed: Boswellia, Devil's claw, Turmeric-curcumin   Magne sports topical rub with frankincense and myrrh      Surgical discussion: Discussed potential future surgery in the left ankle; less likely right heel  Follow up: 3 weeks: X-rays ankle and foot right plus left  Work status: Regarding her ankle, she can do sitting work with very limited standing and walking on level surfaces only  Work status: Her ankle; however, it is not her major contributing problem therefore her back and left leg debilities will determine her work    This note was created using Dragon voice recognition software which may result in errors of speech and spelling recognition and word/phrase syntax errors.

## 2022-07-12 NOTE — LETTER
[employer]  Arthritis oral choices: Individual Turmeric-Curcumin; Brandee Deeds; Boswellia                           -or-   Combination [employer] Turmeric strength for joints that includes all 3 listed above     Magne topical Sports:   Balm or liquid Spray with frankincense/myrrh      Nerve medication options:   CBD, Alpha Lipoic acid, Lion's ruma and any other recommended neuropathic medication            Immune/healing possibilities:  Echinacea, Elderberry    As Needed: Dead sea bath salts, Essential Oils, scar cream, Gout and cramping medications    The above list of recommended medications is only a starting point. Please allow the experts at [facility] to make the final recommendations. Before using any of these medications, please make sure that you have no concerns, senstivities or allergies to the listed ingredients in each bottle/container. Also, please check with your pharmacist or your primary care physician regarding any and all possible interactions with your other daily medications. [facility] Locations:    S  [address] [place] [address], [address] [place] [address]            Sincerely,      Nadia Villa MD

## 2022-07-12 NOTE — PROGRESS NOTES
Patient was fitted and instructed on an Aircast Air Heel and Reparel Ankle Sleeve for the right foot. Patient was also fitted and instructed with a Reparel Ankle Sleeve for the left foot as well. Jacky Duran for patients left ankle. Patient is aware of when and how long to wear the brace per Dr. Carson Mac. I instructed the patient that the brace should line up medially and laterally along the leg, with the straps secured nicely to insure protection of the ankle. Patient read and signed documenting they understand and agree to [facility] current DME return policy.

## 2022-07-15 ENCOUNTER — OFFICE VISIT (OUTPATIENT)
Dept: ORTHOPEDIC SURGERY | Age: 62
End: 2022-07-15
Payer: COMMERCIAL

## 2022-07-15 DIAGNOSIS — M54.16 LUMBAR RADICULOPATHY: Primary | ICD-10-CM

## 2022-07-15 PROCEDURE — 62323 NJX INTERLAMINAR LMBR/SAC: CPT | Performed by: PHYSICAL MEDICINE & REHABILITATION

## 2022-07-15 RX ORDER — TRIAMCINOLONE ACETONIDE 40 MG/ML
100 INJECTION, SUSPENSION INTRA-ARTICULAR; INTRAMUSCULAR ONCE
Status: COMPLETED | OUTPATIENT
Start: 2022-07-15 | End: 2022-07-15

## 2022-07-15 RX ADMIN — TRIAMCINOLONE ACETONIDE 100 MG: 40 INJECTION, SUSPENSION INTRA-ARTICULAR; INTRAMUSCULAR at 13:46

## 2022-07-15 NOTE — PROGRESS NOTES
Date: 07/15/22   Name: Catracho Carrasquillo    Pre-Procedural Diagnosis:    Diagnosis Orders   1. Lumbar radiculopathy  XR INJ SPINE THER SUBST LUM/SAC W IMG          Procedure: Lumbar Epidural Steroid Injection (KEN)    Precautions: [facility] Precautions spine injections: None. Patient denies any prior sensitivity to steroid, local anesthetic, contrast dye, iodine or shellfish. The procedure was discussed at length with the patient and informed consent was signed. The patient was placed in a prone position on the fluoroscopy table and the skin was prepped and draped in a routine sterile fashion. The area to be injected was anesthetized with approximately 5 cc of 1% Lidocaine. Initially a 22-gauge 3.5 inch inch spinal needle was carefully advanced under fluoroscopic guidance to the left L4-L5 epidural space. At this time 0.25 cc of omnipaque administered. . Once proper placement was confirmed, 3 cc of sterile water and 100 mg of Kenalog were injected through the spinal needle. Fluoroscopic guidance was used intermittently over a 10-minute period to insure proper needle placement and patient safety. A hard copy of the fluoroscopic  images has been placed in the patient's chart. The patient was monitored after the procedure and discharged home without complication.      Resume Meds:  Pt remains on asa 81 mg.    Tess Jean Baptiste MD  07/15/22

## 2022-08-01 ENCOUNTER — OFFICE VISIT (OUTPATIENT)
Dept: ORTHOPEDIC SURGERY | Age: 62
End: 2022-08-01
Payer: COMMERCIAL

## 2022-08-01 DIAGNOSIS — M48.062 LUMBAR STENOSIS WITH NEUROGENIC CLAUDICATION: ICD-10-CM

## 2022-08-01 DIAGNOSIS — M71.38 SYNOVIAL CYST OF LUMBAR FACET JOINT: ICD-10-CM

## 2022-08-01 DIAGNOSIS — M43.16 SPONDYLOLISTHESIS OF LUMBAR REGION: Primary | ICD-10-CM

## 2022-08-01 PROCEDURE — 99213 OFFICE O/P EST LOW 20 MIN: CPT | Performed by: PHYSICIAN ASSISTANT

## 2022-08-01 NOTE — PROGRESS NOTES
Name: Noah Davis  YOB: 1960  Gender: female  MRN: [id number]    CC: Leg Pain (Follow up after injection with [facility] 7/15/22)       HPI: This is a 58y.o. year old female who had a left total knee arthroplasty August 2021 by Dr. Trey Mccartney in [facility]. She had a fall in December at work on her left knee. Since the fall she has felt the left knee giving way and pain in the left knee. She is also experienced numbness of the left leg and the entire leg feels like it will give away. She had extensive physical therapy for her left knee and then requested a second opinion. Dr. Manuel Mandujano in May for a second opinion. There was concern for some instability of the left knee however because of the entire left leg weakness and paresthesias that she is experiencing, there was concern for referred pain from the lumbar spine and a spine issue causing her radicular symptoms. She had an MRI scan of the lumbar spine and has been referred to us for further recommendations. Patient states her leg is giving away with her and she feels she is falling. She 6/23 fell and has been diagnosed with an avulsion fracture of her left ankle. The leg pain is more in the left lateral leg and there is numbness in the lateral aspect of the knee and lateral calf. This is worse with standing and walking. MRI scan of the lumbar spine revealed she has a grade 1 anterior listhesis L4-5 with facet arthropathy and there is a left-sided synovial facet joint cyst that compresses in thecal sac. We felt she likely had L5 nerve compression and that that would contribute to the numbness in the left lateral thigh. We referred her for L4-5 epidural steroid injection. Patient reports she did not perceive any benefit, relief of her leg pain or numbness or discomfort of the left knee after the epidural.  She still has numbness in the left lateral leg especially with standing and walking.     She tells me she did see Dr. Miguelito Chauhan for a third opinion in [facility] and she is scheduled for a left knee revision in October. She is also scheduled for EMG nerve conduction study of the left lower extremity August 16. AMB PAIN ASSESSMENT 8/1/2022   Location of Pain -   Location Modifiers -   Severity of Pain 7   Quality of Pain -   Duration of Pain -   Frequency of Pain -   Date Pain First Started -   Aggravating Factors -   Limiting Behavior -   Result of Injury -   Work-Related Injury -   Are there other pain locations you wish to document? -            ROS/Meds/PSH/PMH/FH/SH: I personally reviewed the patient's collected intake data. Below are the pertinents:    Allergies   Allergen Reactions    Sulfamethoxazole-Trimethoprim Rash         Current Outpatient Medications:     amLODIPine (NORVASC) 5 MG tablet, , Disp: , Rfl:     aspirin 325 MG tablet, Take 325 mg by mouth 2 times daily, Disp: , Rfl:     carvedilol (COREG) 12.5 MG tablet, , Disp: , Rfl:     Cetirizine HCl (WAL-ZYR) 10 MG CAPS, Take 10 mg by mouth, Disp: , Rfl:     diclofenac sodium (VOLTAREN) 1 % GEL, Apply 4 g topically 4 times daily, Disp: , Rfl:     gabapentin (NEURONTIN) 100 MG capsule, , Disp: , Rfl:     HYDROcodone-acetaminophen (NORCO) 5-325 MG per tablet, , Disp: , Rfl:     sertraline (ZOLOFT) 100 MG tablet, , Disp: , Rfl:     pravastatin (PRAVACHOL) 20 MG tablet, Take 20 mg by mouth nightly, Disp: , Rfl:     naloxone 4 MG/0.1ML LIQD nasal spray, 1 spray by Nasal route as needed, Disp: , Rfl:     triamterene-hydroCHLOROthiazide (MAXZIDE-25) 37.5-25 MG per tablet, Take 1 tablet by mouth daily, Disp: , Rfl:     History reviewed. No pertinent surgical history. There is no problem list on file for this patient. Tobacco:  reports that she has quit smoking. Her smoking use included cigarettes.  She has never used smokeless tobacco.  Alcohol:   Social History     Substance and Sexual Activity   Alcohol Use None          Radiographic Studies:       MRI Result (most recent):  MRI LUMBAR SPINE WO CONTRAST 06/03/2022    Narrative  Exam: MRI lumbar spine without contrast.  Indication: Left lower leg pain following total knee arthroplasty. Comparison: None. Contrast: None. Technique: Multiplanar multisequence imaging of the lumbar spine without  contrast.      FINDINGS:  The last well-formed disk is designated as L5-S1 for the purpose of this report. Vertebral bodies were numbered using this convention. Mild grade 1 anterolisthesis at L4-L5. Vertebral body heights are preserved. L1  vertebral body hemangioma evident. Mild heterogeneity of the lumbar spine bone  marrow signal without a focal suspicious osseous lesion. There is a chronic  superior endplate compression deformity versus Schmorl's node without associated  edema at L3. No evidence of acute lumbosacral spine fracture. Lumbar spinal cord  is normal in size and signal intensity. T11-T12: Minimal diffuse disc bulge without significant spinal canal stenosis. No neural foraminal stenosis. T12-L1: No disc bulge or spinal canal stenosis. No neural foraminal stenosis. L1-L2: Minimal disc bulge without significant spinal canal stenosis. No neural  foraminal stenosis. L2-L3: Minimal disc bulge without significant spinal canal stenosis. No neural  foraminal stenosis. L3-L4: Minimal bilateral paracentral disc bulges with minimal spinal canal  stenosis. Mild right neural foraminal stenosis. L4-L5: Uncovering of the disc without a substantial disc bulge. Moderate  bilateral facet arthropathy with ligamentum flavum thickening and osseous  hypertrophy and a left-sided synovial facet joint cyst measuring approximately 8  x 3 mm with slight mass effect upon the thecal sac. Moderate spinal canal  stenosis. Mild bilateral neural foraminal stenosis. L5-S1: No disc bulge or spinal canal stenosis. No neural foraminal stenosis.     Impression  Grade 1 anterolisthesis at L4-L5 with moderate bilateral facet arthropathy and  with a left-sided synovial facet joint cyst measuring 8 x 3 mm with moderate  spinal canal stenosis at this level. Additional degenerative changes as above. I have independently reviewed the x-rays of the lumbar spine. She has mild scoliosis. There is bulky facet arthropathy L4-5 there is an anterior listhesis L4-5. I have also independently reviewed the MRI scan of the lumbar spine. She has a grade 1 anterior listhesis L4-5 with facet arthropathy and there is a left-sided synovial facet joint cyst that compresses in thecal sac. 7/1/22  Three (3) views of the left ankle were obtained. FINDINGS: Small osseous avulsion adjacent to the distal fibula. Joint spaces are grossly unremarkable. Mortise is stable. No suspicious osseous lesions. IMPRESSION:   1. AVULSION FRACTURE OF THE DISTAL FIBULA       Three (3) views of the left foot were obtained. FINDINGS: No acute fracture or subluxation. Joint spaces are maintained. No   suspicious osseous lesions. IMPRESSION:     NO ACUTE FRACTURE OR SUBLUXATION. Assessment/Plan:         Diagnosis Orders   1. Spondylolisthesis of lumbar region        2. Synovial cyst of lumbar facet joint        3. Lumbar stenosis with neurogenic claudication            I reviewed her MRI scan. She does have synovial cyst and spondylolisthesis at L4-5 and this is compressing against the left side of the thecal sac and certainly can be causing the radicular symptoms in her left leg and I explained to her L5 nerve is likely the most affected. We talked about treatment options for the spondylolisthesis and synovial cyst including a trial of lumbar injections were we can try to calm the neuropathic symptoms and sometimes the cyst can be reduced. Ultimately if surgery were indicated, she would need a L4-5 lumbar fusion. He did not perceive relief from the first lumbar epidural steroid injection. She is scheduled for EMG nerve conduction study August 16.   I would like to see these results and its possible that we may want to consider a left L5 selective nerve root block if that fits concordantly with the EMG nerve conduction study results. We will follow-up with me after these results. 4 This is a chronic illness/condition with exacerbation and progression       Has a very small avulsion at the left distal fibula. She was previously splinted however she is not in her splint today. I am recommending an ankle lace up brace follow-up with Dr. Alexia Frazier. No orders of the defined types were placed in this encounter. No orders of the defined types were placed in this encounter. No follow-ups on file. Tracy Richard PA-C  08/01/22      Elements of this note were created using speech recognition software. As such, errors of speech recognition may be present.

## 2022-08-01 NOTE — LETTER
Keary Boast                                                     KERRY HARRELL  1960  MRN [id number]                                              ROOM NUMBER______      Radiographic Studies:    Cervical MRI      Thoracic MRI         Lumbar MRI          Pelvis MRI        CONTRAST    CT Myelogram: _______________   NCS/EMG ________________ ( UE  /  LE )     MRI of ___________________          Other: ____________________      Injections:    KNEE    HIP  Depomedrol _____ mg Euflexxa _____    _______________ TFESI/SNRB  _______________ SI Joint  _______________ KEN    _______________ Facet  _______________Piriformis/ Sciatica      Medications:    Oral Steroids _______________  NSAIDS _______________    Muscle Relaxers _______________  Neurontin/Lyrica _______________    Pain Medicine _______________  Other _______________                       Physical Therapy:    Lumbar     Thoracic      Cervical     Hip       Knee       Shoulder               Traction          Ultrasound          Dry Needling      Referral:    Pain referral:  [facility]   [facility]   Other: ______________________________    Follow-up/ Refer__________________________________________________    Authorization to hold blood thinners:___________________________________

## 2022-08-02 ENCOUNTER — OFFICE VISIT (OUTPATIENT)
Dept: ORTHOPEDIC SURGERY | Age: 62
End: 2022-08-02

## 2022-08-02 VITALS — BODY MASS INDEX: 31.07 KG/M2 | HEIGHT: 68 IN | WEIGHT: 205 LBS

## 2022-08-02 DIAGNOSIS — M72.2 PLANTAR FASCIITIS: ICD-10-CM

## 2022-08-02 DIAGNOSIS — S82.892G CLOSED AVULSION FRACTURE OF LEFT ANKLE WITH DELAYED HEALING, SUBSEQUENT ENCOUNTER: Primary | ICD-10-CM

## 2022-08-02 DIAGNOSIS — M79.671 PAIN OF RIGHT HEEL: ICD-10-CM

## 2022-08-02 DIAGNOSIS — S99.912D INJURY OF LEFT ANKLE, SUBSEQUENT ENCOUNTER: ICD-10-CM

## 2022-08-02 NOTE — PROGRESS NOTES
Patient was prescribed a Reparel Ankle Sleeve and Wraptor Ankle Brace and against medical advice the patient declined to receive/purchase the DME. Patient understands they may take their prescription elsewhere if desired.

## 2022-08-02 NOTE — LETTER
DME Patient Authorization Form    Name: Jesus Branham  : 1960  MRN: [id number]   Age: 58 y.o. Gender: female  Delivery Address: [facility]     Diagnosis:     ICD-10-CM    1. Closed avulsion fracture of left ankle with delayed healing, subsequent encounter  S82.892G XR ANKLE STANDARD BILATERAL     XR FOOT LIMITED BILATERAL     Wraptor Ankle Brace ()     Reparel Ankle Sleeve ()      2. Injury of left ankle, subsequent encounter  S99.912D XR ANKLE STANDARD BILATERAL     XR FOOT LIMITED BILATERAL     Wraptor Ankle Brace ()     Reparel Ankle Sleeve ()      3. Pain of right heel  M79.671 XR ANKLE STANDARD BILATERAL     XR FOOT LIMITED BILATERAL     Wraptor Ankle Brace ()     Reparel Ankle Sleeve ()      4. Plantar fasciitis  M72.2 XR ANKLE STANDARD BILATERAL     XR FOOT LIMITED BILATERAL     Wraptor Ankle Brace ()     Reparel Ankle Sleeve ()           Requested DME:  Reparel Ankle Sleeve**AANKL ($30) X 1 - left  Wraptor Ankle Brace - -63 ($129.00) X 1 - left        Clinical Notes:     **Indicates non-covered items by insurance. Payment expected on date of service. Electronically signed by  Provider: Alex Naidu MD__ Date: 2022                             [facility] Tax ID # [id number]        Durable Medical Equipment and/or Orthotics Patient Consent     I understand that my physician has prescribed this medical supply as part of my treatment plan as a matter of Medical Necessity.  I understand that I have a choice in where I receive my prescribed orthopedic supplies and/or services.  I authorize [facility] to furnish this service/product and to provide my insurance carrier with any information requested in order to process for payment.    I instruct my insurance carrier to pay [facility] directly for these services/products.  I understand that my insurance carrier may deny payment for this supply because it is a non-covered item, deemed not medically necessary or considered experimental.   I understand that any cost not covered by my insurance carrier will be solely my financial responsibility.  I have received the Supplier Standards and have reviewed them.  I have received the prescribed item and have been fully instructed on the proper use of the above services/products.    ______ (Patient Initials) I understand that all DME items are non-returnable after being dispensed. Items still in sealed packaging may be returned up to 14 days after purchasing. [address] will replace items that are defective.    ______ (Patient Initials) I understand that [facility] will not file a claim with my insurance carrier for this service/product and I am waiving my right to file a claim on my own for this service/product with my insurance company as this item is NON-COVERED (Denoted by the **) by my Insurance company/policy. ______ (Patient Initials) I understand that I am responsible to bring my equipment to the hospital for any surgery. ______________________________________________  ________________________  Patient / Scott Marion            Thank you for considering [address] Your physician has prescribed specific medical equipment or devices for your home use. The following describes your rights and responsibilities as our customer. Right to Choose Providers: You have a choice regarding which company supplies your home medical equipment and devices, and to consult your physician in this decision. You may choose a medical supply store, a home medical equipment provider, or a specialist such as [facility].   [facility] will coordinate with your physician to provide the medical equipment or devices prescribed for your home use. Right to Service:  You have the right to considerate, respectful and nondiscriminatory care. You have the right to receive accurate and easily understood information about your health care. If you speak a foreign language, or don't understand the discussions, assistance will be provided to allow you to make informed health care decisions. You have the right to know your treatment options and to participate in decisions about your care, including the right to accept or refuse treatment. You have the right to expect a reasonable response to your requests for treatment or service. You have the right to talk in confidence with health care providers and to have your health care information protected. You have the right to receive an explanation of your bill. You have the right to complain about the service or product you receive. Patient Responsibilities:  Please provide complete and accurate information about your health insurance benefits and make arrangements for the timely payment of your bill. [facility] will, if possible, assume responsibility for billing your insurance (Medicare, Medicaid and commercial) for the prescribed equipment or devices. If your policy does not cover the prescribed product, or only covers a portion of the bill, you are responsible for any remaining balance. Return and Exchange Policy:  [facility] will honor published  Warranties for products. [facility] will accept returns or exchanges within 14 days from the date of receipt, providin) the product must be in new condition; 2) receipt as required; and 3) used disposable and hygiene products may only be returned due to a defective product. Note: Refunds will be issued in a timely manner, please allow 4-6 weeks for processing.    Complaint Procedures and DME Consumer Protection Resources:  [facility] values you as a customer, and is committed to resolving patient concerns. This commitment includes understanding and documenting your concerns, conducting a review of internal procedures, and providing you with an explanation and resolution to your concerns. Should you have any questions about our services or billing process, please contact our office at (practice phone number). If we are unable to resolve the concern, you have the right to direct comments to the office of Consumer Protection, in the [address] [place] [address]. S. or the [facility] office, without fear of repercussion. DMEPOS SUPPLIER STANDARDS    A supplier must be in compliance with all applicable Federal and [employer] and regulatory requirements. A supplier must provide complete and accurate information on the DMEPOS supplier application. Any changes to this information must be reported to the [facility] within 30 days. An authorized individual (one whose signature is binding) must sign the application for billing privileges. A supplier must fill orders from its own inventory, or must contract with other companies for the purchase of items necessary to fill the order. A supplier may not contract with any entity that is currently excluded from the Medicare program, any [facility] program, or from any other Federal procurement or Nonprocurement programs. A supplier must advise beneficiaries that they may rent or purchase inexpensive or routinely purchased durable medical equipment, and of the purchase option for capped rental equipment. A supplier must notify beneficiaries of warranty coverage and honor all warranties under applicable State Law, and repair or replace free of charge Medicare covered items that are under warranty. A supplier must maintain a physical facility on an appropriate site. A supplier must permit CMS, or its agents to conduct on-site inspections to ascertain the supplier's compliance with these standards.   The supplier location must be accessible to beneficiaries during reasonable business hours, and must maintain a visible sign and posted hours of operation. A supplier must maintain a primary business telephone listed under the name of the business in a [employer] or a toll free number available through directory assistance. The exclusive use of a beeper, answering machine or cell phone is prohibited. A supplier must have comprehensive liability insurance in the amount of at least $300,000 that covers both the supplier's place of business and all customers and employees of the supplier. If the supplier manufactures its own items, this insurance must also cover product liability and completed operations. A supplier must agree not to initiate telephone contact with beneficiaries, with a few exceptions allowed. This standard prohibits suppliers from calling beneficiaries in order to solicit new business. A supplier is responsible for delivery and must instruct beneficiaries on use of Medicare covered items, and maintain proof of delivery. A supplier must answer questions, and respond to complaints of the beneficiaries, and maintain documentation of such contacts. A supplier must maintain and replace at no charge or repair directly, or through a service contract with another company, Medicare covered items it has rented to beneficiaries. A supplier must accept returns of substandard (less than full quality for the particular item) or unsuitable items (inappropriate for the beneficiary at the time it was fitted and rented or sold) from beneficiaries. A supplier must disclose these supplier standards to each beneficiary to whom it supplies a Medicare-covered item. A supplier must disclose to the government any person having ownership, financial, or control interest in the supplier.   A supplier must not convey or reassign a supplier number; i.e., the supplier may not sell or allow another entity to use its Medicare billing number. A supplier must have a complaint resolution protocol established to address beneficiary complaints that relate to these standards. A record of these complaints must be maintained at the physical facility. Complaint records must include: the name, address, telephone number and health insurance claim number of the beneficiary, a summary of the complaint, and any action taken to resolve it. A supplier must agree to furnish CMS any information required by the Medicare statute and implementing regulations. A supplier of DMEPOS and other items and services must be accredited by a CMS-approved accreditation organization in order to receive and retain a supplier billing number. The accreditation must indicate the specific products and services, for which the supplier is accredited in order for the supplier to receive payment for those specific products and services. A DMEPOS supplier must notify their accreditation organization when a new DMEPOS location is opened. The accreditation organization may accredit the new supplier location for three months after it is operational without requiring a new site visit. All DMEPOS supplier locations, whether owned or subcontracted, must meet the [employer] and be separately accredited in order to bill Medicare. An accredited supplier may be denied enrollment or their enrollment may be revoked, if CMS determines that they are not in compliance with the DMEPOS quality standards. A DMEPOS supplier must disclose upon enrollment all products and services, including the addition of new product lines for which they are seeking accreditation. If a new product line is added after enrollment, the DMEPOS supplier will be responsible for notifying the accrediting body of the new product so that the DMEPOS supplier can be re-surveyed and accredited for these new products. Must meet the surety bond requirements specified in 42 C. F.R. 424.57(c).

## 2022-08-02 NOTE — PROGRESS NOTES
Name: Yamel Magaña  YOB: 1960  Gender: female  MRN: [id number]    07/12/2022: Initial visit with me  07/15/2022: Dr. Maik Springer epidural steroid injection  08/01/2022: Ms. Sandy Michelle consultation with notation of:  I reviewed her MRI scan. She does have synovial cyst and spondylolisthesis at L4-5 and this is compressing against the left side of the thecal sac and certainly can be causing the radicular symptoms in her left leg and I explained to her L5 nerve is likely the most affected. We talked about treatment options for the spondylolisthesis and synovial cyst including a trial of lumbar injections were we can try to calm the neuropathic symptoms and sometimes the cyst can be reduced. Ultimately if surgery were indicated, she would need a L4-5 lumbar fusion. She did not perceive relief from the first lumbar epidural steroid injection. She is scheduled for EMG nerve conduction study August 16. I would like to see these results and its possible that we may want to consider a left L5 selective nerve root block if that fits concordantly with the EMG nerve conduction study results. We will follow-up with me after these results. 08/02/2022: She presents today with me for to assess her left ankle and right heel. Both are feeling better    HPI:   06/23/2022: An Med ER reflects a fall with complaints of left knee, left ankle, left foot and right ankle pain. She had a history of an radicular symptoms to the left lower extremity which causes her to fall occasionally. Reflected left leg gave way causing her to collapse and fall. ER reflects left ankle with moderate soft tissue swelling and palpable tenderness of the lateral malleolus. Right ankle with no appreciable swelling or trauma with mild tenderness over the ATFL.    06/23/2022: X-rays from the ER regarding the left knee with radiologic impression of no acute fracture or subluxation.   Radiographs of the left ankle radiologic impression: Avulsion fracture distal fibula with diffuse soft tissue swelling.    06/23/2022: ER diagnoses: Closed fracture distal lateral malleolus of left fibula: Sprain collateral ligament left knee: Sprain right ankle    07/01/2022: Ms. Bull [address], [place] outlined \". .. a fall in December injuring her head giving way and pain in the left knee. She also experienced numbness in the left leg and the entire leg feels like it will give way. She had extensive physical therapy and requested a second opinion. Dr. Nicci Leggett second opinion [06/01/2022]. [facility] Krist[facility] \"  07/01/2022: Ms. Bull [address], NP diagnosis closed avulsion fracture distal end of left fibula, low back pain, lumbar stenosis with neurogenic claudication, spondylolisthesis of lumbar region, synovial cyst of lumbar facet joint, lumbar radiculopathy, left ankle injury. She recommended a lace up ankle brace and an appointment with me. She also outlined treatment recommendations regarding her back. .    07/12/2022: Patient presents for consultation with me regarding her left ankle and right heel   1. Right heel pain has been present for uncertain amount of time; she reports being treated in [facility] with a steroid by mouth that did seem to help. Right heel pain has increased since her left ankle and leg incident  2. Left ankle pain. She tried a lace up ankle brace for Ms. Lyric Murillo but it put too much pressure on her outer ankle so she has been removing and reapplying a splint from the ER    ROS/Meds/PSH/PMH/FH/SH: reviewed today    Tobacco:  reports that she has quit smoking. Her smoking use included cigarettes. She has never used smokeless tobacco.     Physical Examination:  Patient appears to be alert and oriented with acceptable appearance.   No obvious distress or SOB  CV: appears to have acceptable vascular color and capillary refill  Neuro:appears to have mostly intact light touch sensation   Skin: Left lateral ankle soft tissue swelling  MS: Standing: Plantigrade: Gait protected left and first steps right  Right = very minimal low tarsal tunnel, plantar medial heel pain; no posterior or lateral tuberosity pain; no fascial deficiency  Right = full ankle/foot motion; 5/5 strength; no instability or crepitance  Left = lateral ankle pain; no midfoot pain; has ankle and foot motion; 5/5 strength; hard to assess instability    XR: Left side: Standing AP lateral mortise ankle plus AP oblique foot taken today with plantar heel enthesopathy; lateral malleolar sleeve type avulsion fracture  XR Impression:  As above      XR: Right side: Standing AP lateral mortise ankle plus AP oblique foot taken today with no acute pathology appreciated; plantar heel enthesopathy similar to the left  XR Impression:  As above      Injection: No indication for injection    Assessment:    Right plantar fasciitis, low tarsal tunnel syndrome - stable and resolving  Left ankle injury; lateral malleolar sleeve avulsion fracture - stable     Plan:   The patient and I discussed the above assessment. We explored treatment options. At this time, she would like to put her ankle more on the back burner   She is dealing with pending revision left TKA in October 2022. She will continue with her TKA Dr.  She is also dealing with her spine work-up that may require spine surgery  She will see Ms. Sarah Calvo after her EMG/nerve conduction studies     Advanced medical imaging:  If no resolution or improvement: Left ankle MRI scan to assess lateral malleolar sleeve avulsion fracture for a peroneal tendon tear    DME:  Right = she has subsequently weaned the: Air heel: She uses a regular compression support instead of the Reparel sleeve  Left = lace up ankle brace: Reparel sleeve  We discussed right and left foot and ankle care and protection  PT: Instead of formal PT, at this time she was instructed on strict home exercise stretching program  Orthotic/prosthetic: Future consideration for custom insoles       Medication - OTC meds prn: Prescribed: Boswellia, Jhonny's claw, Turmeric-curcumin   Magne sports topical rub with frankincense and myrrh      Surgical discussion: Discussed potential future surgery in the left ankle; less likely right heel  Follow up: 4 weeks: X-rays ankle and foot left  Work status: Regarding her ankle, she can do sitting work with very limited standing and walking on level surfaces only  Work status: Her ankle; however, it is not her major contributing problem therefore her back and left leg debilities will determine her work    This note was created using Dragon voice recognition software which may result in errors of speech and spelling recognition and word/phrase syntax errors.

## 2022-08-30 ENCOUNTER — OFFICE VISIT (OUTPATIENT)
Dept: ORTHOPEDIC SURGERY | Age: 62
End: 2022-08-30
Payer: COMMERCIAL

## 2022-08-30 VITALS — WEIGHT: 210 LBS | BODY MASS INDEX: 31.83 KG/M2 | HEIGHT: 68 IN

## 2022-08-30 DIAGNOSIS — S82.892G CLOSED AVULSION FRACTURE OF LEFT ANKLE WITH DELAYED HEALING, SUBSEQUENT ENCOUNTER: Primary | ICD-10-CM

## 2022-08-30 DIAGNOSIS — S99.912D INJURY OF LEFT ANKLE, SUBSEQUENT ENCOUNTER: ICD-10-CM

## 2022-08-30 PROCEDURE — 99213 OFFICE O/P EST LOW 20 MIN: CPT | Performed by: ORTHOPAEDIC SURGERY

## 2022-08-30 NOTE — PROGRESS NOTES
Name: Moses Hidalgo  YOB: 1960  Gender: female  MRN: [id number]    07/12/2022: Initial visit with me  08/02/2022: Last visit with me  08/30/2022: She returns feeling better in the left ankle. No right side pain    HPI:   06/23/2022: An Med ER reflects a fall with complaints of left knee, left ankle, left foot and right ankle pain. She had a history of an radicular symptoms to the left lower extremity which causes her to fall occasionally. Reflected left leg gave way causing her to collapse and fall. ER reflects left ankle with moderate soft tissue swelling and palpable tenderness of the lateral malleolus. Right ankle with no appreciable swelling or trauma with mild tenderness over the ATFL.    06/23/2022: X-rays from the ER regarding the left knee with radiologic impression of no acute fracture or subluxation. Radiographs of the left ankle radiologic impression: Avulsion fracture distal fibula with diffuse soft tissue swelling.    06/23/2022: ER diagnoses: Closed fracture distal lateral malleolus of left fibula: Sprain collateral ligament left knee: Sprain right ankle    07/01/2022: Ms. Lee KARMEN Adams outlined \". .. a fall in December injuring her head giving way and pain in the left knee. She also experienced numbness in the left leg and the entire leg feels like it will give way. She had extensive physical therapy and requested a second opinion. Dr. Medardo Monzon second opinion [06/01/2022]. Nicole Stewart \"  07/01/2022: Ms. Lee NANNETTE Adams diagnosis closed avulsion fracture distal end of left fibula, low back pain, lumbar stenosis with neurogenic claudication, spondylolisthesis of lumbar region, synovial cyst of lumbar facet joint, lumbar radiculopathy, left ankle injury. She recommended a lace up ankle brace and an appointment with me. She also outlined treatment recommendations regarding her back. .    07/12/2022: Consultation with me regarding her left ankle and right heel   1.  Right heel pain has been present for uncertain amount of time; she reports being treated in [place] with a steroid by mouth that did seem to help. Right heel pain has increased since her left ankle and leg incident  2. Left ankle pain. She tried a lace up ankle brace for Ms. Birgit Conn but it put too much pressure on her outer ankle so she has been removing and reapplying a splint from the ER    07/15/2022: Dr. Diane Villela epidural steroid injection  08/01/2022: Ms. Glenny Smith consultation with notation of:  I reviewed her MRI scan. She does have synovial cyst and spondylolisthesis at L4-5 and this is compressing against the left side of the thecal sac and certainly can be causing the radicular symptoms in her left leg and I explained to her L5 nerve is likely the most affected. We talked about treatment options for the spondylolisthesis and synovial cyst including a trial of lumbar injections were we can try to calm the neuropathic symptoms and sometimes the cyst can be reduced. Ultimately if surgery were indicated, she would need a L4-5 lumbar fusion. She did not perceive relief from the first lumbar epidural steroid injection. She is scheduled for EMG nerve conduction study August 16. I would like to see these results and its possible that we may want to consider a left L5 selective nerve root block if that fits concordantly with the EMG nerve conduction study results. We will follow-up with me after these results    ROS/Meds/PSH/PMH/FH/SH: reviewed today    Tobacco:  reports that she has quit smoking. Her smoking use included cigarettes. She has never used smokeless tobacco.     Physical Examination:  Patient appears to be alert and oriented with acceptable appearance.   No obvious distress or SOB  CV: appears to have acceptable vascular color and capillary refill  Neuro:appears to have mostly intact light touch sensation   Skin: Left lateral ankle soft tissue swelling  MS: Standing: Plantigrade: Gait protected left and first steps right  Right heel  Follow up: 4 weeks: X-rays ankle and foot left  Work status: Limited standing walking work    This note was created using Dragon voice recognition software which may result in errors of speech and spelling recognition and word/phrase syntax errors.

## 2022-08-31 ENCOUNTER — TELEPHONE (OUTPATIENT)
Dept: ORTHOPEDIC SURGERY | Age: 62
End: 2022-08-31

## 2022-08-31 NOTE — TELEPHONE ENCOUNTER
Received [employer] form by fax and sent to [facility] for payment and completion

## 2022-09-13 ENCOUNTER — TELEPHONE (OUTPATIENT)
Dept: ORTHOPEDIC SURGERY | Age: 62
End: 2022-09-13

## 2022-09-13 NOTE — TELEPHONE ENCOUNTER
Received [facility] life [employer] accident short term disab form completed and signed by Dr Katie Hernandez, faxed to [facility] at fax# [phone] 9/13/22

## 2022-11-21 ENCOUNTER — TELEPHONE (OUTPATIENT)
Dept: ORTHOPEDIC SURGERY | Age: 62
End: 2022-11-21

## 2022-11-21 NOTE — TELEPHONE ENCOUNTER
Spoke with patient who states she just had a knee replacement and is not having leg pain anymore. She is still experiencing leg numbness and wants to try another epidural injection because she didn't have a much numbness after the injection. According to Moo Shelton last note, she was scheduled for EMG of LE. She did have this due the knee surgeon instructing her not to get it. He then performed surgery on her knee. I will confer with Mya to further assess.

## 2022-11-29 ENCOUNTER — OFFICE VISIT (OUTPATIENT)
Dept: ORTHOPEDIC SURGERY | Age: 62
End: 2022-11-29

## 2022-11-29 DIAGNOSIS — M43.16 SPONDYLOLISTHESIS OF LUMBAR REGION: ICD-10-CM

## 2022-11-29 DIAGNOSIS — M71.38 SYNOVIAL CYST OF LUMBAR FACET JOINT: ICD-10-CM

## 2022-11-29 DIAGNOSIS — M47.816 FACET ARTHROPATHY, LUMBAR: Primary | ICD-10-CM

## 2022-11-29 NOTE — LETTER
Berot Lights                                                     KERRY HARRELL  1960  MRN [id number]                                              ROOM NUMBER______      Radiographic Studies:    Cervical MRI      Thoracic MRI         Lumbar MRI          Pelvis MRI        CONTRAST    CT Myelogram: _______________   NCS/EMG ________________ ( UE  /  LE )     MRI of ___________________          Other: ____________________      Injections:    KNEE    HIP  Depomedrol _____ mg Euflexxa _____    _______________ TFESI/SNRB  _______________ SI Joint  _______________ KEN    _______________ Facet  _______________Piriformis/ Sciatica      Medications:    Oral Steroids _______________  NSAIDS _______________    Muscle Relaxers _______________  Neurontin/Lyrica _______________    Pain Medicine _______________  Other _______________                       Physical Therapy:    Lumbar     Thoracic      Cervical     Hip       Knee       Shoulder               Traction          Ultrasound          Dry Needling      Referral:    Pain referral:  [facility]   [facility]   Other: ______________________________    Follow-up/ Refer__________________________________________________    Authorization to hold blood thinners:___________________________________

## 2022-11-29 NOTE — PROGRESS NOTES
Name: Rosy Burgos  YOB: 1960  Gender: female  MRN: [id number]    CC: Leg Pain (Recheck)       HPI: This is a 58y.o. year old female who I previously saw for lower back and left leg pain. When I initially saw her in August, she had fallen after a left total knee replacement that was done in [place] and had a second opinion regarding the left knee. She was having left leg giving away with her and that there was some concern that this could be coming from her lower back and an MRI scan of been done. We did see that she had anterior listhesis of L4-5 and some facet arthropathy there is a left-sided synovial cyst and we referred her for a L4-5 epidural steroid injection to see if that would help with any of the left-sided symptoms however it did not and subsequently she has had revision left total knee arthroplasty and her leg feels much more stable. She continues to have some numbness on the lateral aspect of the lower thigh and lateral knee. She is now having a lot of back pain across her back. She has had physical therapy, NSAIDs, pain medication because she had a knee replacement but that pain is progressively worsening and is interfering with her ability to stand and walk. Pain is primarily in the back and not really radiating pain level can get up to 6 out of 10.       Allergies   Allergen Reactions    Sulfamethoxazole-Trimethoprim Rash       Current Outpatient Medications:     amLODIPine (NORVASC) 5 MG tablet, , Disp: , Rfl:     aspirin 325 MG tablet, Take 325 mg by mouth 2 times daily, Disp: , Rfl:     carvedilol (COREG) 12.5 MG tablet, , Disp: , Rfl:     Cetirizine HCl (WAL-ZYR) 10 MG CAPS, Take 10 mg by mouth, Disp: , Rfl:     diclofenac sodium (VOLTAREN) 1 % GEL, Apply 4 g topically 4 times daily, Disp: , Rfl:     gabapentin (NEURONTIN) 100 MG capsule, , Disp: , Rfl:     HYDROcodone-acetaminophen (NORCO) 5-325 MG per tablet, , Disp: , Rfl:     sertraline (ZOLOFT) 100 MG tablet, , Disp: , Rfl:     pravastatin (PRAVACHOL) 20 MG tablet, Take 20 mg by mouth nightly, Disp: , Rfl:     naloxone 4 MG/0.1ML LIQD nasal spray, 1 spray by Nasal route as needed, Disp: , Rfl:     triamterene-hydroCHLOROthiazide (MAXZIDE-25) 37.5-25 MG per tablet, Take 1 tablet by mouth daily, Disp: , Rfl:   No past medical history on file. Tobacco:  reports that she has quit smoking. Her smoking use included cigarettes. She has never used smokeless tobacco.  Alcohol:   Social History     Substance and Sexual Activity   Alcohol Use Yes    Comment: occ       Exam:    She ambulates with slight left antalgic gait. There is tenderness to palpation In the lower lumbar spine lower lumbar facet area    Positive straight leg raise on the left. 5 out of 5 equal bilateral lower extremity strength testing    Radiographic Studies:       MRI Results (most recent):  . I independently reviewed the MRI scan of the lumbar spine. There is an anterior listhesis L4-5. There is lateral recess stenosis L4-5 there is a left L4-5 synovial cyst with just some abutment on the left lateral recess and does create more stenosis on the left side. Other levels without significant stenosis. Assessment/Plan:            ICD-10-CM    1. Facet arthropathy, lumbar  M47.816 Ambulatory Referral to Spine Injection      2. Spondylolisthesis of lumbar region  M43.16 Ambulatory Referral to Spine Injection      3. Synovial cyst of lumbar facet joint  M71.38 Ambulatory Referral to Spine Injection         She currently is having more facet agenic pain. She is very tender over the lumbar facets. She does have facet arthritis L4-5 and L5-S1 there is a synovial cyst present as well. Currently recommend trial of lumbar injections and this time would try bilateral L4-5 and L5-S1 facet injections. If she perceives significant relief, could consider rhizotomy.     - Injection: The patient will be referred for a lumbar steroid injection to help reduce the symptoms. The patient understands the risks including hyperglycemia, immunosuppression, meningitis, cerebrospinal fluid leak, epidural hematoma, and reaction to medication. The patient may benefit from additional injections depending on the response. The injection should be a Bilateral L4-5 and L5-S1 Facet injections      No orders of the defined types were placed in this encounter. Orders Placed This Encounter   Procedures    Ambulatory Referral to Spine Injection          4 This is a chronic illness/condition with exacerbation and progression      Return for lumbar injection with JPM, lumbar injection recheck wt TOBI, 4 weeks after. Luda Thao PA-C  11/29/22      Elements of this note were created using speech recognition software. As such, errors of speech recognition may be present.

## 2022-11-29 NOTE — PATIENT INSTRUCTIONS
Spondylitis (Spinal Arthritis) and Facet Joint Syndrome  At each level in our spine, there is a single disc  the bones (vertebrae) in front of the spinal canal, and a pair of joints called facet joints joining the bones together behind the spinal canal. As we age, the spinal discs and facet joints can wear out and degenerate. Disc degeneration is the term used to describe the wearing out of the discs. Spondylosis is the term used to describe degeneration and arthritis of the facet joints. Degeneration of the spine is a normal aging process, and in most cases spinal arthritis does not cause significant symptoms. However, for some people, arthritic facet joints can cause significant pain. Back or neck pain resulting from arthritic or inflamed facet joints is a condition called facet joint syndrome.         Symptoms  Back pain with radiation into hips and buttocks or neck pain with radiation into the shoulders  Natural History (Doing Nothing)  Not all arthritic facet joints cause symptoms   Back or neck pain may not be coming from the facet joints even if they are arthritic   Symptoms may resolve without treatment   Symptoms may be short-lived and infrequent   Rarely, patients develop more persistent and debilitating pain   Facet joints have very little ability to repair themselves or regenerate  Three Phases of Treatment:   Phase I - Non-Invasive Treatments   Phase II - Spinal Injections   Phase III - Surgery (rare for this condition unless radiculopathy is present)   Goals of Each Phase:   Relieve Pain   Improve Function  Treatment Options: Phase I - Non-Invasive Treatments  Physical Therapy and Regular Home Exercise   Neck or Back Strengthening   Flexibility and Stretching  Oral Medications   Steroids   Non-Steroid Anti-Inflammatories (NSAIDs)   Pain relievers   Muscle Relaxants  Ice and Heat   4-6 weeks of Phase I treatment before MRI and going to Phase II  Treatment Options: Phase II - Facet Joint Injections and Facet Joint Nerve Ablation (RFNA)  Facet Joint Injections   Outpatient procedure   Done with x-ray guidance   Steroid is injected into the inflamed joint to reduce pain   May relieve symptoms, but will not reverse the joint arthritis   Successful injection may help confirm that pain is coming from the facet joints   Facet Joint Rhizotomy (Nerve Ablation) (Radiofrequency Nerve Ablation - RFNA)  The word rhizotomy means nerve destruction or nerve ablation. In facet rhizotomy, the tiny nerve fibers that carry pain signals from the facet joints to the brain are selectively destroyed using some form of energy. For patients who have had successful, but temporary relief of their back or neck pain from the facet injections, facet rhizotomy may provide more long-term relief. Facet rhizotomy is most commonly performed using a form of energy called radiofrequency (RF) energy. When done with RF, this technique is often called radiofrequency nerve ablation (RFNA). Treatment Options: Phase III - Surgery  Rarely needed for Spondylosis or Facet Joint Syndrome unless radiculopathy or stenosis is present   Back and neck pain from Spondylosis can be treated non-surgically in most cases    [facility]    MD Antonia Morales MD  [id number] [place], [place] [address], PAVICENTE Brewer NP    Main Office  [address] [facility],[address], [address] [place] [address] [address]        For Appointments at either location, please call [phone]  Patient Education        Facet Joint Injection: Before Your Procedure  What is a facet joint injection? A facet joint injection is a shot of medicine to help with pain from arthritis. The injection goes into your neck or back. Where you get your shot depends on where your pain is. Facet joints connect your vertebrae to each other along the back of your spine.  Problems in these joints can cause long-term (chronic) pain in the neck or back. Numbing medicine is injected into the facet joint to see if that is the cause of your pain. If it does help your pain, your doctor may add a steroid medicine to the injection. Steroids reduce swelling and pain, but they don't always work. How do you prepare for the procedure? Procedures can be stressful. This information will help you understand what you can expect. And it will help you safely prepare for your procedure. Preparing for the procedure    Be sure you have someone to take you home. Anesthesia and pain medicine will make it unsafe for you to drive or get home on your own. Understand exactly what procedure is planned, along with the risks, benefits, and other options. If you take a medicine that prevents blood clots, your doctor may tell you to stop taking it before your procedure. Or your doctor may tell you to keep taking it. (These medicines include aspirin and other blood thinners.) Make sure that you understand exactly what your doctor wants you to do. Tell your doctor ALL the medicines, vitamins, supplements, and herbal remedies you take. Some may increase the risk of problems during your procedure. Your doctor will tell you if you should stop taking any of them before the procedure and how soon to do it. Make sure your doctor and the hospital have a copy of your advance directive. If you don't have one, you may want to prepare one. It lets others know your health care wishes. It's a good thing to have before any type of surgery or procedure. What happens on the day of the procedure? Follow the instructions exactly about when to stop eating and drinking. If you don't, your procedure may be canceled. If your doctor told you to take your medicines on the day of the procedure, take them with only a sip of water. Take a bath or shower before you come in for your procedure. Do not apply lotions, perfumes, deodorants, or nail polish.      Take off all jewelry and piercings. And take out contact lenses, if you wear them. At the hospital or surgery center   Bring a picture ID. You may get medicine that relaxes you or puts you in a light sleep. The area being worked on will be numb. The procedure will take 10 to 30 minutes. When should you call your doctor? You have questions or concerns. You don't understand how to prepare for your procedure. You become ill before the procedure (such as fever, flu, or a cold). You need to reschedule or have changed your mind about having the procedure. Current as of: March 9, 2022               Content Version: 13.4  © 2006-2022 [employer]. Care instructions adapted under license by [facility]. If you have questions about a medical condition or this instruction, always ask your healthcare professional. [address] any warranty or liability for your use of this information.

## 2022-12-08 ENCOUNTER — OFFICE VISIT (OUTPATIENT)
Dept: ORTHOPEDIC SURGERY | Age: 62
End: 2022-12-08

## 2022-12-08 DIAGNOSIS — M43.16 SPONDYLOLISTHESIS OF LUMBAR REGION: ICD-10-CM

## 2022-12-08 DIAGNOSIS — M71.38 SYNOVIAL CYST OF LUMBAR FACET JOINT: ICD-10-CM

## 2022-12-08 DIAGNOSIS — M47.816 FACET ARTHROPATHY, LUMBAR: Primary | ICD-10-CM

## 2022-12-08 RX ORDER — METHYLPREDNISOLONE ACETATE 80 MG/ML
80 INJECTION, SUSPENSION INTRA-ARTICULAR; INTRALESIONAL; INTRAMUSCULAR; SOFT TISSUE ONCE
Status: COMPLETED | OUTPATIENT
Start: 2022-12-08 | End: 2022-12-08

## 2022-12-08 RX ADMIN — METHYLPREDNISOLONE ACETATE 80 MG: 80 INJECTION, SUSPENSION INTRA-ARTICULAR; INTRALESIONAL; INTRAMUSCULAR; SOFT TISSUE at 09:32

## 2022-12-08 NOTE — PROGRESS NOTES
Name: Ade Brown  YOB: 1960  Gender: female  MRN: [id number]    Procedure: Bilateral  L4-L5 and L5-S1 facet joint injections     Precautions: Ade Brown deniesprior sensitivity to steroid, local anesthetic, iodine, or shellfish. The procedure was discussed at length with her and informed consent was signed and placed in the chart. She was placed in a prone position on the fluoroscopy table and the skin was prepped and draped in a routine sterile fashion. The areas to be injected were each anesthetized with 1% lidocaine. Next, a 25-gauge 3.5 inch spinal needle was carefully advanced under fluoroscopic guidance to the rightL4 - L5 facet joint. Aspiration was negative. Once proper placement was confirmed, 1 ml of 0.25% Marcaine and  20mg depomedrol were injected through the spinal needle. The above procedure was then repeated through the rightL5 - S1, and left L4 - L5, and leftL5 - S1 facet joints. A total of 80 mg of depomedrol was used for today's procedure (20 mg depomedrol was given at each of the 4 injection sites). Fluoroscopic guidance was used intermittently over a 10-minute period to insure proper needle placement and her safety. A hard copy of the fluoroscopic images has been placed in her chart and is saved on the C-arm hard drive. She was monitored for 30 minutes after the procedure and discharged home in a stable fashion with a routine follow up. Procedural Diagnosis:     ICD-10-CM    1. Facet arthropathy, lumbar  M47.816 FL INJ LUMB/SAC FACET SINGLE LEVEL     XR INJ FACET LUMB SACRAL 2ND LVL     methylPREDNISolone acetate (DEPO-MEDROL) injection 80 mg      2. Spondylolisthesis of lumbar region  M43.16 FL INJ LUMB/SAC FACET SINGLE LEVEL     XR INJ FACET LUMB SACRAL 2ND LVL     methylPREDNISolone acetate (DEPO-MEDROL) injection 80 mg      3.  Synovial cyst of lumbar facet joint  M71.38 FL INJ LUMB/SAC FACET SINGLE LEVEL     XR INJ FACET LUMB SACRAL 2ND LVL methylPREDNISolone acetate (DEPO-MEDROL) injection 80 mg           Jerry Gutierrez MD  12/08/22